# Patient Record
Sex: FEMALE | Race: WHITE
[De-identification: names, ages, dates, MRNs, and addresses within clinical notes are randomized per-mention and may not be internally consistent; named-entity substitution may affect disease eponyms.]

---

## 2018-09-01 ENCOUNTER — HOSPITAL ENCOUNTER (EMERGENCY)
Dept: HOSPITAL 97 - ER | Age: 58
Discharge: ANOTHER HOSPITAL | End: 2018-09-01
Payer: COMMERCIAL

## 2018-09-01 DIAGNOSIS — F17.210: ICD-10-CM

## 2018-09-01 DIAGNOSIS — H53.9: ICD-10-CM

## 2018-09-01 DIAGNOSIS — Z88.8: ICD-10-CM

## 2018-09-01 DIAGNOSIS — R27.0: Primary | ICD-10-CM

## 2018-09-01 DIAGNOSIS — Z88.0: ICD-10-CM

## 2018-09-01 DIAGNOSIS — I10: ICD-10-CM

## 2018-09-01 DIAGNOSIS — Z88.5: ICD-10-CM

## 2018-09-01 LAB
ALBUMIN SERPL BCP-MCNC: 3.4 G/DL (ref 3.4–5)
ALP SERPL-CCNC: 105 U/L (ref 45–117)
ALT SERPL W P-5'-P-CCNC: 14 U/L (ref 12–78)
AST SERPL W P-5'-P-CCNC: 16 U/L (ref 15–37)
BUN BLD-MCNC: 13 MG/DL (ref 7–18)
CKMB CREATINE KINASE MB: < 1 NG/ML (ref 0.3–3.6)
GLUCOSE SERPLBLD-MCNC: 109 MG/DL (ref 74–106)
HCT VFR BLD CALC: 41.6 % (ref 36–45)
INR BLD: 0.98
LYMPHOCYTES # SPEC AUTO: 1.5 K/UL (ref 0.7–4.9)
MAGNESIUM SERPL-MCNC: 2.5 MG/DL (ref 1.8–2.4)
MCH RBC QN AUTO: 31.3 PG (ref 27–35)
MCV RBC: 92.6 FL (ref 80–100)
NT-PROBNP SERPL-MCNC: 658 PG/ML (ref ?–125)
PMV BLD: 10.1 FL (ref 7.6–11.3)
POTASSIUM SERPL-SCNC: 3.9 MMOL/L (ref 3.5–5.1)
RBC # BLD: 4.49 M/UL (ref 3.86–4.86)
TROPONIN (EMERG DEPT USE ONLY): < 0.02 NG/ML (ref 0–0.04)

## 2018-09-01 PROCEDURE — 96374 THER/PROPH/DIAG INJ IV PUSH: CPT

## 2018-09-01 PROCEDURE — 85025 COMPLETE CBC W/AUTO DIFF WBC: CPT

## 2018-09-01 PROCEDURE — 70450 CT HEAD/BRAIN W/O DYE: CPT

## 2018-09-01 PROCEDURE — 36415 COLL VENOUS BLD VENIPUNCTURE: CPT

## 2018-09-01 PROCEDURE — 81003 URINALYSIS AUTO W/O SCOPE: CPT

## 2018-09-01 PROCEDURE — 80048 BASIC METABOLIC PNL TOTAL CA: CPT

## 2018-09-01 PROCEDURE — 80076 HEPATIC FUNCTION PANEL: CPT

## 2018-09-01 PROCEDURE — 71045 X-RAY EXAM CHEST 1 VIEW: CPT

## 2018-09-01 PROCEDURE — 99285 EMERGENCY DEPT VISIT HI MDM: CPT

## 2018-09-01 PROCEDURE — 85610 PROTHROMBIN TIME: CPT

## 2018-09-01 PROCEDURE — 84484 ASSAY OF TROPONIN QUANT: CPT

## 2018-09-01 PROCEDURE — 82553 CREATINE MB FRACTION: CPT

## 2018-09-01 PROCEDURE — 83735 ASSAY OF MAGNESIUM: CPT

## 2018-09-01 PROCEDURE — 82550 ASSAY OF CK (CPK): CPT

## 2018-09-01 PROCEDURE — 83880 ASSAY OF NATRIURETIC PEPTIDE: CPT

## 2018-09-01 PROCEDURE — 93005 ELECTROCARDIOGRAM TRACING: CPT

## 2018-09-01 PROCEDURE — 85730 THROMBOPLASTIN TIME PARTIAL: CPT

## 2018-09-01 NOTE — RAD REPORT
EXAM DESCRIPTION:  CT - Head Brain Wo Cont - 9/1/2018 1:07 pm

 

CLINICAL HISTORY:  WEAKNESS<Reason For Exam>WEAKNESS

 

COMPARISON:  Head angio dated 3/15/2017<Comparisons>

 

TECHNIQUE:  Axial 5 mm thick images of the head were obtained without IV contrast.

 

All CT scans are performed using dose optimization technique as appropriate and may include automated
 exposure control or mA/KV adjustment according to patient size.

 

FINDINGS:  No intracranial hemorrhage, mass, edema or shift of mid-line structures. No acute infarcti
on changes seen. No abnormal extra-axial fluid collections. Ventricles are normal. No significant atr
ophy or chronic ischemic changes.

 

Mastoid air cells and visualized portions of the paranasal sinuses are clear.

 

No acute bony findings.

 

 

IMPRESSION:  Negative non-contrast CT head examination.  No significant change from prior study.

## 2018-09-01 NOTE — EDPHYS
Physician Documentation                                                                           

 [facility]                                                                

Name: Ashley Lloyd                                                                              

Age: 58 yrs                                                                                       

Sex: Female                                                                                       

: 1960                                                                                   

MRN: [id number]                                                                                   

Arrival Date: 2018                                                                          

Time: 11:28                                                                                       

Account#: [id number]                                                                            

Bed 8                                                                                             

Private MD: Compa Muniz                                                                         

ED Physician Elvin Burks                                                                       

HPI:                                                                                              

                                                                                             

12:14 This 58 yrs old  Female presents to ER via Wheelchair with complaints of       jmm 

      Dizziness, Vomiting, Vision Problem.                                                        

12:14 The patient presents with feeling off balance. Onset: The symptoms/episode              jmm 

      began/occurred gradually, 1 day(s) ago. Associated signs and symptoms: Pertinent            

      positives: blurred vision, Pertinent negatives: chest pain, shortness of breath. This       

      is a 58 year old female with history of HTN, HLP that presents to the ED with dizziness     

      beginning yesterday with difficulty walking. Patient states she felt chills 3 days ago.     

      Patient also complains of generalized blurred vision beginning yesterday. .                 

                                                                                                  

Historical:                                                                                       

- Allergies:                                                                                      

11:37 Morphine;                                                                               la1 

11:37 PENICILLINS;                                                                            la1 

11:37 Topamax;                                                                                la1 

12:00 Vytorin 10-10;                                                                          aa5 

- PMHx:                                                                                           

11:37 COPD; Fibromyalgia; High Cholesterol; Hypertension; Osteoporosis;                       la1 

- PSHx:                                                                                           

12:25 Gastric Sleeve;                                                                         aa5 

12:00 spinal stimulator;                                                                      aa5 

                                                                                                  

- Immunization history:: Adult Immunizations up to date.                                          

- Social history:: Smoking status: Patient uses tobacco products, smokes one-half pack            

  cigarettes per day.                                                                             

- Ebola Screening: : No symptoms or risks identified at this time.                                

                                                                                                  

                                                                                                  

ROS:                                                                                              

12:14 ENT: Negative for injury, pain, and discharge, Cardiovascular: Negative for chest pain, jmm 

      palpitations, and edema, Respiratory: Negative for shortness of breath, cough,              

      wheezing, and pleuritic chest pain, Abdomen/GI: Negative for abdominal pain, nausea,        

      vomiting, diarrhea, and constipation.                                                       

12:14 Constitutional: Positive for chills.                                                        

12:14 Eyes: Positive for blurry vision.                                                           

12:14 Neuro: Positive for dizziness, gait disturbance, visual changes.                            

12:14 All other systems are negative.                                                             

                                                                                                  

Exam:                                                                                             

12:14 Head/Face:  atraumatic.                                                                 jmm 

12:14 Cardiovascular:  Regular rate and rhythm.  No edema appreciated Respiratory:  Normal        

      respirations, no respiratory distress appreciated                                           

12:14 Constitutional: The patient appears alert, awake, anxious.                                  

12:14 Musculoskeletal/extremity: ROM: intact in all extremities.                                  

12:14 Skin: Appearance: Color: normal in color.                                                   

12:14 Neuro: Orientation: is normal, Mentation: is normal, Memory: is normal, Cerebellar          

      function: normal finger to nose testing, Gait: is unsteady, generalized tremers noted.      

12:14 Psych: Behavior/mood is pleasant, cooperative.                                              

                                                                                                  

Vital Signs:                                                                                      

11:38  / 83; Pulse 82; Resp 16; Temp 98.9(TE); Pulse Ox 100% on R/A; Weight 68.04 kg;   la1 

      Height 5 ft. 3 in. (160.02 cm);                                                             

12:30  / 82; Pulse 75; Resp 18 S; Pulse Ox 99% on R/A;                                  aa5 

13:30  / 68; Pulse 54; Resp 18 S; Pulse Ox 95% on R/A; Pain 0/10;                       aa5 

15:18  / 68; Pulse 65; Resp 18 S; Pulse Ox 99% on R/A; Pain 0/10;                       aa5 

11:38 Body Mass Index 26.57 (68.04 kg, 160.02 cm)                                             la1 

                                                                                                  

NIH Stroke Scale Scores:                                                                          

12:00 NIHSS Score: 1                                                                          aa5 

                                                                                                  

MDM:                                                                                              

12:09 Patient medically screened.                                                             [facility] 

12:17 Differential diagnosis: CVA, generalized weakness, idiopathic dizziness, TIA, vertigo.  [facility] 

      Data reviewed: vital signs, nurses notes.                                                   

13:54 ED course: I discussed the patient with [facility] whom accepted transfer. [facility] 

      .                                                                                           

14:12 Counseling: I had a detailed discussion with the patient and/or guardian regarding: the [facility] 

      historical points, exam findings, and any diagnostic results supporting the                 

      discharge/admit diagnosis, radiology results, the need to transfer to another facility.     

      ED course: I discussed the patient with Dr. Golden whom accepted admission. .       

      ED course: Patient will be transferred due to concerns for CVA. .                           

                                                                                                  

                                                                                             

12:56 Order name: CBC with Automated Diff; Complete Time: 13:08                               EDMS

                                                                                             

13:04 Order name: Protime (+INR); Complete Time: 13:08                                        EDMS

                                                                                             

13:04 Order name: PTT, Activated Partial Thromb; Complete Time: 13:08                         EDMS

                                                                                             

13:11 Order name: Basic Metabolic Panel                                                       [facility]

                                                                                             

12:09 Order name: XRAY Chest (1 view)                                                         [facility] 

                                                                                             

12:09 Order name: EKG; Complete Time: 12:45                                                   [facility] 

                                                                                             

12:09 Order name: Cardiac monitoring; Complete Time: 12:14                                    [facility] 

                                                                                             

12:09 Order name: EKG - Nurse/Tech; Complete Time: 12:14                                      [facility] 

                                                                                             

12:10 Order name: CT Head Brain wo Cont; Complete Time: 14:54                                 [facility] 

                                                                                             

13:11 Order name: EKG Electrocardiogram                                                       [facility]

                                                                                             

13:11 Order name: CKMB Creatine Kinase MB                                                     [facility]

                                                                                             

13:11 Order name: Creatine Phosphokinase                                                      [facility]

                                                                                             

13:11 Order name: Liver (Hepatic) Function                                                    [facility]

                                                                                             

13:12 Order name: Magnesium                                                                   [facility]

                                                                                             

13:12 Order name: NT PRO-BNP                                                                  [facility]

                                                                                             

13:12 Order name: Troponin (Emerg Dept Use Only)                                              [facility]

                                                                                             

13:12 Order name: Chest Single View; Complete Time: 14:54                                     [facility]

                                                                                             

13:20 Order name: Urine Dipstick--Ancillary (enter results); Complete Time: 13:58               

                                                                                             

12:09 Order name: IV Saline Lock; Complete Time: 12:14                                        [facility] 

                                                                                             

12:09 Order name: Labs collected and sent; Complete Time: 12:14                               [facility] 

                                                                                             

12:09 Order name: O2 Per Protocol; Complete Time: 12:14                                       [facility] 

                                                                                             

12:09 Order name: O2 Sat Monitoring; Complete Time: 12:14                                     [facility] 

                                                                                             

12:09 Order name: Urine Dipstick-Ancillary (obtain specimen); Complete Time: 12:14            [facility] 

                                                                                                  

Administered Medications:                                                                         

13:12 Drug: Zofran 4 mg Route: IVP; Site: left forearm;                                       aa5 

13:20 Follow up: Response: No adverse reaction                                                aa5 

14:17 Drug: Bactrim (160 mg-800 mg (DS) 1 tablet Route: PO;                                   aa5 

15:20 Follow up: Response: No adverse reaction                                                aa5 

                                                                                                  

                                                                                                  

Disposition:                                                                                      

15:40 Co-signature as Attending Physician, Elvin Burks MD I agree with the assessment and   kdr 

      plan of care.                                                                               

                                                                                                  

Disposition:                                                                                      

18 14:02 Transfer ordered to [facility]. Diagnosis are Ataxia,          

  unspecified, Weakness.                                                                          

- Reason for transfer: Higher level of care.                                                      

- Accepting physician is [facility].                                                           

- Condition is Stable.                                                                            

- Problem is new.                                                                                 

- Symptoms are unchanged.                                                                         

                                                                                                  

                                                                                                  

                                                                                                  

                                                                                                  

NIH Stroke Scale - NIH Stroke Score                                                               

Date: 2018                                                                                  

Time: 12:00                                                                                       

Total Score = 1                                                                                   

  1a. Level of Consciousness (LOC) - 0(Alert)                                                     

  1b. Level of Consciousness (LOC) (Year \T\ Age) - 1(One)                                        

  1c. LOC Commands (Open \T\ Closes Eyes/) - 0(Both)                                          

   2. Best Gaze (Lateral Gaze Paresis) - 0(Normal)                                                

   3. Visual Field Loss - 0(No visual loss)                                                       

   4. Facial Palsy - 0(Normal)                                                                    

  5a. Left Arm: Motor (10-second hold) - 0(No drift)                                              

  5b. Right Arm: Motor (10-second hold) - 0(No drift)                                             

  6a. Left Leg: Motor (5-second hold - always test supine) - 0(No drift)                          

  6b. Right Leg: Motor (5-second hold - always test supine) - 0(No drift)                         

   7. Limb Ataxia (finger/nose \T\ heel/shin - test with eyes open) - 0(Absent)                   

   8. Sensory Loss (pinprick arms/legs/face) - 0(Normal)                                          

   9. Best Language: Aphasia (description/naming/reading) - 0(No aphasia)                         

  10. Dysarthria (speech clarity - read or repeat words) - 0(Normal)                              

  11. Extinction and Inattention (visual/tactile/auditory/spatial/personal) - 0(No                

      abnormality)                                                                                

Initials: aa5                                                                                     

                                                                                                  

Signatures:                                                                                       

Dispatcher [employer]                           EDMS                                                 

Elvin Burks MD MD kdr Mickail, Joel, PA PA jmm Calderon, Audri, RN                     RN   aa5                                                  

Ang Fraser RN                         RN   la1                                                  

                                                                                                  

Corrections: (The following items were deleted from the chart)                                    

14:11 12:45 PROTIME (+INR)+COAG.LAB.BRZ ordered. EDMS                                 EDMS        

14:11 12:45 PTT, ACTIVATED+COAG.LAB.BRZ ordered. EDMS                                 EDMS        

14:13 12:45 BASIC METABOLIC PANEL+C.LAB.BRZ ordered. EDMS                             EDMS        

14:13 12:45 CBC+H.LAB.BRZ ordered. EDMS                                               EDMS        

14:13 12:45 CKMB+C.LAB.BRZ ordered. EDMS                                              EDMS        

14:13 12:45 CREATINE PHOSPHOKINASE+C.LAB.BRZ ordered. EDMS                            EDMS        

14:13 12:45 HEPATIC FUNCTION+C.LAB.BRZ ordered. EDMS                                  EDMS        

14:13 12:45 MAGNESIUM+C.LAB.BRZ ordered. EDMS                                         EDMS        

14:13 12:45 PROBNP+C.LAB.BRZ ordered. EDMS                                            EDMS        

14:13 12:45 TROPONIN (EMERG DEPT USE ONLY)+C.LAB.BRZ ordered. EDMS                    EDMS        

14:15 11:37 Allergies: VITORIN; la1                                                   aa5         

15:27 14:02 2018 14:02 Transfer ordered to [facility].      aa5         

      Diagnosis is Ataxia, unspecified; Weakness. Reason for transfer: Higher level               

      of care. Accepting physician is [facility]. Condition is Stable. Problem is              

      new. Symptoms are unchanged. freeman                                                            

                                                                                                  

**************************************************************************************************

## 2018-09-01 NOTE — ER
Nurse's Notes                                                                                     

 [facility]                                                                

Name: Ashley Lloyd                                                                              

Age: 58 yrs                                                                                       

Sex: Female                                                                                       

: 1960                                                                                   

MRN: [id number]                                                                                   

Arrival Date: 2018                                                                          

Time: 11:28                                                                                       

Account#: [id number]                                                                            

Bed 8                                                                                             

Private MD: Compa Muniz                                                                         

Diagnosis: Ataxia, unspecified;Weakness                                                           

                                                                                                  

Presentation:                                                                                     

                                                                                             

11:36 Presenting complaint: Patient states: vomiting for one week, dizziness and fuzzy vision la1 

      since 0800 yesterday (Friday) morning. No acute neruo deficits noted. Transition of         

      care: patient was not received from another setting of care. Onset of symptoms was          

      2018 at 08:00. Risk Assessment: Do you want to hurt yourself or someone          

      else? Patient reports no desire to harm self or others. Initial Sepsis Screen: Does the     

      patient meet any 2 criteria? No. Patient's initial sepsis screen is negative. Does the      

      patient have a suspected source of infection? No. Patient's initial sepsis screen is        

      negative. Care prior to arrival: None.                                                      

11:36 Method Of Arrival: Wheelchair                                                           la1 

11:36 Acuity: EMILY 3                                                                           la1 

                                                                                                  

Historical:                                                                                       

- Allergies:                                                                                      

11:37 Morphine;                                                                               la1 

11:37 PENICILLINS;                                                                            la1 

11:37 Topamax;                                                                                la1 

12:00 Vytorin 10-10;                                                                          aa5 

- PMHx:                                                                                           

11:37 COPD; Fibromyalgia; High Cholesterol; Hypertension; Osteoporosis;                       la1 

- PSHx:                                                                                           

12:25 Gastric Sleeve;                                                                         aa5 

12:00 spinal stimulator;                                                                      aa5 

                                                                                                  

- Immunization history:: Adult Immunizations up to date.                                          

- Social history:: Smoking status: Patient uses tobacco products, smokes one-half pack            

  cigarettes per day.                                                                             

- Ebola Screening: : No symptoms or risks identified at this time.                                

                                                                                                  

                                                                                                  

Screenin:25 Abuse screen: Denies threats or abuse. Nutritional screening: No deficits noted.        aa5 

      Tuberculosis screening: No symptoms or risk factors identified. Fall Risk Fall in past      

      12 months (25 points). IV access (20 points). Mental Status- Overestimates/Forgets          

      Limitations (15 pts.). Total Grajeda Fall Scale indicates High Risk Score (45 or more         

      points). Fall prevention measures have been instituted. Side Rails Up X 2.                  

                                                                                                  

Assessment:                                                                                       

12:00 General: Appears comfortable, Behavior is calm, cooperative. Pain: Denies pain. Neuro:  aa5 

      Level of Consciousness is awake, obeys commands, confused, Oriented to person, place,       

      situation,  are equal bilaterally Moves all extremities. Gait is unsteady, Speech      

      is normal, Facial symmetry appears normal, Pupils are PERRLA, Reports dizziness with        

      movement that began yesterday. Pt also reports blurry vision since yesterday. Pt also       

      reports unsteady gait since Thursday. Pt is slow to respond to questions. Pt's              

      significant other states "I just noticed that she is a little bit confused today" .         

      Denies weakness paresthesias numbness headache. Cardiovascular: Heart tones S1 S2           

      present Rhythm is regular. Respiratory: Airway is patent Respiratory effort is even,        

      unlabored, Respiratory pattern is regular, symmetrical, Breath sounds are clear             

      bilaterally. GI: Abdomen is round non-distended, Bowel sounds present X 4 quads. Abd is     

      soft and non tender X 4 quads. Reports vomiting on and off since . Pt states "I had     

      a gastric sleeve done back in May" Patient currently denies nausea. : No signs and/or     

      symptoms were reported regarding the genitourinary system. EENT: No signs and/or            

      symptoms were reported regarding the EENT system. Derm: Skin is pink, warm \T\ dry.         

      Musculoskeletal: Range of motion: intact in all extremities.                                

13:05 Reassessment: Patient and/or family updated on plan of care and expected duration. Pain aa5 

      level reassessed. Pt reports nausea, PA notified .                                          

13:05 Neuro: Level of Consciousness is awake, obeys commands, confused, Oriented to person,   aa5 

      place, situation. Cardiovascular: Rhythm is sinus bradycardia. Respiratory: Airway is       

      patent Respiratory effort is even, unlabored, Respiratory pattern is regular,               

      symmetrical. Derm: Skin is pink, warm \T\ dry.                                              

14:00 Reassessment: Patient and/or family updated on plan of care and expected duration. Pain aa5 

      level reassessed. Patient is alert, oriented x 3, equal unlabored respirations, skin        

      warm/dry/pink. Patient denies pain at this time.                                            

15:00 Reassessment: Patient and/or family updated on plan of care and expected duration. Pain aa5 

      level reassessed. Patient is alert, oriented x 3, equal unlabored respirations, skin        

      warm/dry/pink. Patient denies pain at this time.                                            

15:00 Cardiovascular: Rhythm is sinus rhythm.                                                 aa5 

                                                                                                  

Vital Signs:                                                                                      

11:38  / 83; Pulse 82; Resp 16; Temp 98.9(TE); Pulse Ox 100% on R/A; Weight 68.04 kg;   la1 

      Height 5 ft. 3 in. (160.02 cm);                                                             

12:30  / 82; Pulse 75; Resp 18 S; Pulse Ox 99% on R/A;                                  aa5 

13:30  / 68; Pulse 54; Resp 18 S; Pulse Ox 95% on R/A; Pain 0/10;                       aa5 

15:18  / 68; Pulse 65; Resp 18 S; Pulse Ox 99% on R/A; Pain 0/10;                       aa5 

11:38 Body Mass Index 26.57 (68.04 kg, 160.02 cm)                                             la1 

                                                                                                  

NIH Stroke Scale Scores:                                                                          

12:00 NIHSS Score: 1                                                                          aa5 

                                                                                                  

ED Course:                                                                                        

11:28 Patient arrived in ED.                                                                  mr  

11:28 Compa Muniz MD is Private Physician.                                                 mr  

11:37 Triage completed.                                                                       la1 

11:38 Arm band placed on right wrist.                                                         la1 

11:51 Miguel Ángel Jose PA is PHCP.                                                              [facility] 

11:51 Elvin Burks MD is Attending Physician.                                              [facility] 

12:00 Patient has correct armband on for positive identification. Placed in gown. Bed in low  aa5 

      position. Call light in reach. Side rails up X2. Cardiac monitor on. Pulse ox on. NIBP      

      on.                                                                                         

12:15 Kellen Patterson, RN is Primary Nurse.                                                   aa5 

12:29 Urine collected: clean catch specimen, cloudy, rebel colored, EKG done, by ED staff,    jb1 

      reviewed by Miguel Ángel MCKEON.                                                                

12:35 Inserted saline lock: 20 gauge in left forearm, using aseptic technique. Blood          mb4 

      collected.                                                                                  

12:35 Initial lab(s) drawn, by me, sent to lab.                                               mb4 

13:12 Chest Single View In Process Unspecified.                                               EDMS

13:24 CT Head Brain wo Cont In Process Unspecified.                                           EDMS

13:41 initiated a transfer with Kathleen at the [facility] transfer center.                  eb  

13:51 connected the neurotologist from [facility] with Miguel Ángel MCKEON for patient transfer       eb  

      consulation.                                                                                

13:57 connected the hospitalist from [facility] with Miguel Ángel Mckeon for patient transfer         eb  

      consulation.                                                                                

14:07 administrative approval given by Ludmila Miller at the [facility] transfer center. Pt  eb  

      going to [id number]. Dr. Bowles accepted the patient in transfer. Report      

      to be called to [phone].                                                               

15:00 No provider procedures requiring assistance completed.                                  aa5 

15:20 Patient transferred, IV remains in place.                                               aa5 

                                                                                                  

Administered Medications:                                                                         

13:12 Drug: Zofran 4 mg Route: IVP; Site: left forearm;                                       aa5 

13:20 Follow up: Response: No adverse reaction                                                aa5 

14:17 Drug: Bactrim (160 mg-800 mg (DS) 1 tablet Route: PO;                                   aa5 

15:20 Follow up: Response: No adverse reaction                                                aa5 

                                                                                                  

                                                                                                  

Outcome:                                                                                          

14:02 ER care complete, transfer ordered by MD. millard 

15:20 Transferred by ground EMS to [facility], Transfer form completed.    aa5 

      X-rays sent w/ patient. Note:  Report given to [facility] and EDDIE Zabala (at [facility])      

15:20 Condition: stable                                                                           

15:20 Instructed on the need for transfer, Demonstrated understanding of instructions.            

15:27 Patient left the ED.                                                                    aa5 

                                                                                                  

                                                                                                  

NIH Stroke Scale - NIH Stroke Score                                                               

Date: 2018                                                                                  

Time: 12:00                                                                                       

Total Score = 1                                                                                   

  1a. Level of Consciousness (LOC) - 0(Alert)                                                     

  1b. Level of Consciousness (LOC) (Year \T\ Age) - 1(One)                                        

  1c. LOC Commands (Open \T\ Closes Eyes/) - 0(Both)                                          

   2. Best Gaze (Lateral Gaze Paresis) - 0(Normal)                                                

   3. Visual Field Loss - 0(No visual loss)                                                       

   4. Facial Palsy - 0(Normal)                                                                    

  5a. Left Arm: Motor (10-second hold) - 0(No drift)                                              

  5b. Right Arm: Motor (10-second hold) - 0(No drift)                                             

  6a. Left Leg: Motor (5-second hold - always test supine) - 0(No drift)                          

  6b. Right Leg: Motor (5-second hold - always test supine) - 0(No drift)                         

   7. Limb Ataxia (finger/nose \T\ heel/shin - test with eyes open) - 0(Absent)                   

   8. Sensory Loss (pinprick arms/legs/face) - 0(Normal)                                          

   9. Best Language: Aphasia (description/naming/reading) - 0(No aphasia)                         

  10. Dysarthria (speech clarity - read or repeat words) - 0(Normal)                              

  11. Extinction and Inattention (visual/tactile/auditory/spatial/personal) - 0(No                

      abnormality)                                                                                

Initials: aa5                                                                                     

                                                                                                  

Signatures:                                                                                       

Dispatcher [employer]                           EDEllis Hoover                                 jb1                                                  

Miguel Ángel Jose PA PA jmm Rivera, Maria                                mr PattersonKellen, RN                     RN   aa5                                                  

Ang Fraser RN                         RN   la1                                                  

Dianelys Mckeon Mackenzie                            mb4                                                  

                                                                                                  

Corrections: (The following items were deleted from the chart)                                    

14:15 11:37 Allergies: VITORIN; la1                                                   aa5         

14:16 12:00 Neuro: Level of Consciousness is awake, obeys commands, confused,         aa5         

      Oriented to person, place, situation,  are equal bilaterally Moves all                 

      extremities. Gait is unsteady, Speech is normal, Facial symmetry appears                    

      normal, Pupils are PERRLA, Reports dizziness with movement that began                       

      yesterday. Pt also reports blurry vision since yesterday. Pt also reports                   

      unsteady gait since Thursday. Pt is slow to respond to questions. Pt's                      

      significant other states "I just noticed that she is a little bit confused                  

      today" . aa5                                                                                

15:20 13:30 Pulse 54bpm; Resp 18bpm; Spontaneous; Pulse Ox 95% RA; Pain 0/10; aa5     aa5         

15:20 15:04  / 68; Pulse 62bpm; Pulse Ox 97% RA; mb4                            aa5         

                                                                                                  

**************************************************************************************************

## 2018-09-01 NOTE — RAD REPORT
EXAM DESCRIPTION:  RAD - Chest Single View - 9/1/2018 12:41 pm

 

CLINICAL HISTORY:  weakness, chills<Reason For Exam>weakness, chills

Abdominal pain

 

COMPARISON:  Chest Pa And Lat (2 Views) dated 4/27/2017; Chest Single View dated 3/15/2017; Chest Pa 
And Lat (2 Views) dated 2/1/2017<Comparisons>

 

TECHNIQUE:  AP portable chest image was obtained 1230 hours .

 

FINDINGS:  Lungs are clear. Heart and vasculature are normal. No measurable pleural effusion and no p
neumothorax. No gross bony abnormality seen. No acute aortic findings suspected. Neurostimulator wire
s overlie the lower thoracic spine.

 

IMPRESSION:  No acute cardiopulmonary process.

 

No significant change from comparison.

## 2018-09-02 NOTE — EKG
Test Date:    2018-09-01               Test Time:    12:19:09

Technician:   LLOYD                                    

                                                     

MEASUREMENT RESULTS:                                       

Intervals:                                           

Rate:         65                                     

PA:           182                                    

QRSD:         94                                     

QT:           370                                    

QTc:          384                                    

Axis:                                                

P:            50                                     

PA:           182                                    

QRS:          22                                     

T:            0                                      

                                                     

INTERPRETIVE STATEMENTS:                                       

                                                     

Normal sinus rhythm

ST & T wave abnormality, consider anterior ischemia

Abnormal ECG

Compared to ECG 03/15/2017 21:22:46

ST (T wave) deviation now present

Possible ischemia now present



Electronically Signed On 09-02-18 08:36:29 CDT by Gerald Pittman